# Patient Record
Sex: FEMALE | Race: WHITE | NOT HISPANIC OR LATINO | Employment: FULL TIME | URBAN - METROPOLITAN AREA
[De-identification: names, ages, dates, MRNs, and addresses within clinical notes are randomized per-mention and may not be internally consistent; named-entity substitution may affect disease eponyms.]

---

## 2023-10-31 ENCOUNTER — TELEMEDICINE (OUTPATIENT)
Dept: PRIMARY CARE | Facility: CLINIC | Age: 53
End: 2023-10-31
Payer: COMMERCIAL

## 2023-10-31 ENCOUNTER — PHARMACY VISIT (OUTPATIENT)
Dept: PHARMACY | Facility: CLINIC | Age: 53
End: 2023-10-31

## 2023-10-31 DIAGNOSIS — H66.93 BILATERAL OTITIS MEDIA, UNSPECIFIED OTITIS MEDIA TYPE: ICD-10-CM

## 2023-10-31 DIAGNOSIS — U07.1 COVID-19: Primary | ICD-10-CM

## 2023-10-31 PROBLEM — R10.9 ABDOMINAL PAIN: Status: ACTIVE | Noted: 2023-10-31

## 2023-10-31 PROBLEM — F33.9 MAJOR DEPRESSION, RECURRENT, CHRONIC (CMS-HCC): Status: ACTIVE | Noted: 2023-10-31

## 2023-10-31 PROBLEM — G56.02 LEFT CARPAL TUNNEL SYNDROME: Status: ACTIVE | Noted: 2023-10-31

## 2023-10-31 PROBLEM — F41.9 ANXIETY: Status: ACTIVE | Noted: 2023-10-31

## 2023-10-31 PROBLEM — R13.19 ESOPHAGEAL DYSPHAGIA: Status: ACTIVE | Noted: 2023-10-31

## 2023-10-31 PROBLEM — R51.9 HEADACHE: Status: ACTIVE | Noted: 2023-10-31

## 2023-10-31 PROBLEM — F90.0 ATTENTION DEFICIT HYPERACTIVITY DISORDER (ADHD), PREDOMINANTLY INATTENTIVE TYPE: Status: ACTIVE | Noted: 2023-10-31

## 2023-10-31 PROBLEM — S63.509A WRIST SPRAIN: Status: ACTIVE | Noted: 2023-10-31

## 2023-10-31 PROBLEM — N95.0 POSTMENOPAUSAL BLEEDING: Status: ACTIVE | Noted: 2023-10-31

## 2023-10-31 PROBLEM — J32.9 RECURRENT SINUSITIS: Status: ACTIVE | Noted: 2023-10-31

## 2023-10-31 PROBLEM — F51.01 PRIMARY INSOMNIA: Status: ACTIVE | Noted: 2023-10-31

## 2023-10-31 PROBLEM — G89.29 OTHER CHRONIC PAIN: Status: ACTIVE | Noted: 2023-10-31

## 2023-10-31 PROBLEM — S69.90XA WRIST INJURY: Status: ACTIVE | Noted: 2023-10-31

## 2023-10-31 PROBLEM — E78.5 HYPERLIPIDEMIA LDL GOAL <130: Status: ACTIVE | Noted: 2023-10-31

## 2023-10-31 PROBLEM — R53.82 CHRONIC FATIGUE: Status: ACTIVE | Noted: 2023-10-31

## 2023-10-31 PROBLEM — N88.2 STENOSIS OF CERVIX: Status: ACTIVE | Noted: 2023-10-31

## 2023-10-31 PROBLEM — G43.709 CHRONIC MIGRAINE W/O AURA W/O STATUS MIGRAINOSUS, NOT INTRACTABLE: Status: ACTIVE | Noted: 2023-10-31

## 2023-10-31 PROBLEM — F90.8 ADHD, ADULT RESIDUAL TYPE: Status: ACTIVE | Noted: 2023-10-31

## 2023-10-31 PROBLEM — G56.03 CARPAL TUNNEL SYNDROME ON BOTH SIDES: Status: ACTIVE | Noted: 2023-10-31

## 2023-10-31 PROBLEM — N83.202 CYST OF OVARY, LEFT: Status: ACTIVE | Noted: 2023-10-31

## 2023-10-31 PROBLEM — E55.9 VITAMIN D DEFICIENCY DISEASE: Status: ACTIVE | Noted: 2023-10-31

## 2023-10-31 PROBLEM — F41.8 ANXIETY WITH DEPRESSION: Status: ACTIVE | Noted: 2023-10-31

## 2023-10-31 PROBLEM — N95.1 MENOPAUSAL SYMPTOM: Status: ACTIVE | Noted: 2023-10-31

## 2023-10-31 PROBLEM — F32.A DEPRESSION: Status: ACTIVE | Noted: 2023-10-31

## 2023-10-31 PROBLEM — R87.810 CERVICAL HIGH RISK HPV (HUMAN PAPILLOMAVIRUS) TEST POSITIVE: Status: ACTIVE | Noted: 2023-10-31

## 2023-10-31 PROBLEM — N92.6 IRREGULAR MENSES: Status: ACTIVE | Noted: 2023-10-31

## 2023-10-31 PROBLEM — K90.41 NON-CELIAC GLUTEN SENSITIVITY: Status: ACTIVE | Noted: 2023-10-31

## 2023-10-31 PROBLEM — Z78.9 MEDICAL HOME PATIENT: Status: ACTIVE | Noted: 2023-10-31

## 2023-10-31 PROCEDURE — 99214 OFFICE O/P EST MOD 30 MIN: CPT | Performed by: FAMILY MEDICINE

## 2023-10-31 RX ORDER — NIRMATRELVIR AND RITONAVIR 150-100 MG
2 KIT ORAL 2 TIMES DAILY
Qty: 20 TABLET | Refills: 0 | Status: SHIPPED | OUTPATIENT
Start: 2023-10-31 | End: 2023-12-19 | Stop reason: ALTCHOICE

## 2023-10-31 RX ORDER — AZITHROMYCIN 250 MG/1
TABLET, FILM COATED ORAL
Qty: 6 TABLET | Refills: 0 | Status: SHIPPED | OUTPATIENT
Start: 2023-10-31 | End: 2023-11-05

## 2023-10-31 RX ORDER — NICOTINE POLACRILEX 4 MG
1 GUM BUCCAL DAILY
COMMUNITY
End: 2023-12-19 | Stop reason: WASHOUT

## 2023-10-31 RX ORDER — METHYLPREDNISOLONE 4 MG/1
TABLET ORAL
Qty: 21 TABLET | Refills: 0 | Status: SHIPPED | OUTPATIENT
Start: 2023-10-31 | End: 2023-11-07

## 2023-10-31 NOTE — ASSESSMENT & PLAN NOTE
- Symptoms with positive on COVID-19 test consistent with infection  -5-day isolation protocol recommended from time of symptom onset; may return to regular activities after isolation if no persisting fevers or significant upper respiratory symptoms  -Social distancing and diligent mask wearing advocated for at least 10 days after isolation  -Recent blood work reviewed with dose adjusted Paxlovid sent  - Recommend supportive care with increased fluid intake to thin secretions, Ibuprofen or Tylenol as needed for pain or fever, and steamy showers/saline nasal rinses to help clear the nasal passages   - You may consider tea with honey or a cinnamon stick as these have natural antiviral and antibiotic properties   - Call if symptoms worsen or do not improve with these treatments

## 2023-10-31 NOTE — PROGRESS NOTES
Outpatient Visit Note    Chief Complaint   Patient presents with    Covid-19 Home Monitoring Visit     Tested positive for COVID, symptoms since Thursday 10/6/23.  Symptoms clogged ears, watery eyes, hoarse voice, sore throat, headache.   Has taken Dayquil and Nightquil, also increased fluid intake.    Unable to obtain any vitals.        With patient's permission, this is a Telemedicine visit with video and audio. The provider and patient participated in this telemedicine encounter.    HPI:  Manda Blas is a 53 y.o. female who presents to the office via telemedicine encounter secondary to COVID-19.    She reports extensive travel with recent upper respiratory congestion, malaise and body aches.  States symptoms became most prominent on 10/26/2023.  Focus on supportive care efforts though was due to travel to [place] so she took a COVID-19 test on Monday, 10/30/2023 which came back positive.  States that she has had COVID-19 a total of 5 times though this episode seems slightly worse than prior episodes, noting bilateral ear fullness, sore throat, headache and vocal strain.  Has been attempting DayQuil, NyQuil and increase fluids.  Denies any fever, chills, shortness of breath, difficulty breathing or chest pain.  Does report to have had bilateral ear infections after prior episodes of COVID-19.  Does express concerns regarding her symptoms particularly as she is due to fly to [facility] next week for work.          Current Medications  Current Outpatient Medications   Medication Instructions    cholecalciferol-soy isoflavone 2,000-64 unit-mg tablet 1 Units, oral, Daily    mv-calcium-min-iron fm-FA-vitK 18 mg-400 mcg- 25 mcg tablet 1 tablet, oral, Daily    venlafaxine (EFFOXOR) 25 mg, oral, Every 24 hours    zinc amino acid chelate 50 mg tablet 1 tablet, oral, Daily        Allergies  Allergies   Allergen Reactions    Penicillins Other and Hives    Aspirin-Acetaminophen-Caffeine Headache     Sulfamethoxazole-Trimethoprim Other and Rash        History reviewed. No pertinent past medical history.   History reviewed. No pertinent surgical history.  No family history on file.  Social History     Tobacco Use    Smoking status: Never     Passive exposure: Never    Smokeless tobacco: Never   Vaping Use    Vaping Use: Never used   Substance Use Topics    Drug use: Never     Tobacco Use: Low Risk  (10/31/2023)    Patient History     Smoking Tobacco Use: Never     Smokeless Tobacco Use: Never     Passive Exposure: Never        ROS  All pertinent positive symptoms are included in the history of present illness.  All other systems have been reviewed and are negative and noncontributory to this patient's current ailments.    VITAL SIGNS  Patient is unable to provide    PHYSICAL EXAM  GENERAL APPEARANCE:  Alert and oriented x 3, Pleasant and cooperative, No acute distress.   LUNGS:  No conversational dyspnea or cough during encounter.   PSYCH:  appropriate mood and affect, no difficulty with speech.   Telemedicine visit, no other exam component done.      Assessment/Plan   Problem List Items Addressed This Visit             ICD-10-CM    COVID-19 - Primary U07.1     - Symptoms with positive on COVID-19 test consistent with infection  -5-day isolation protocol recommended from time of symptom onset; may return to regular activities after isolation if no persisting fevers or significant upper respiratory symptoms  -Social distancing and diligent mask wearing advocated for at least 10 days after isolation  -Recent blood work reviewed with dose adjusted Paxlovid sent  - Recommend supportive care with increased fluid intake to thin secretions, Ibuprofen or Tylenol as needed for pain or fever, and steamy showers/saline nasal rinses to help clear the nasal passages   - You may consider tea with honey or a cinnamon stick as these have natural antiviral and antibiotic properties   - Call if symptoms worsen or do not improve  with these treatments         Relevant Medications    nirmatrelvir-ritonavir (Paxlovid) 150-100 mg tablet therapy pack    Bilateral otitis media H66.93     - Secondary to ear fullness and history of bilateral ear infection following COVID-19 infections, I have proactively sent and azithromycin Z-Aydin and a Medrol Dosepak to your regular pharmacy which she can start regimen if this is ultimately needed         Relevant Medications    azithromycin (Zithromax) 250 mg tablet    methylPREDNISolone (Medrol Dospak) 4 mg tablets

## 2023-10-31 NOTE — ASSESSMENT & PLAN NOTE
- Secondary to ear fullness and history of bilateral ear infection following COVID-19 infections, I have proactively sent and azithromycin Z-Aydin and a Medrol Dosepak to your regular pharmacy which she can start regimen if this is ultimately needed

## 2023-11-03 ENCOUNTER — APPOINTMENT (OUTPATIENT)
Dept: PRIMARY CARE | Facility: CLINIC | Age: 53
End: 2023-11-03

## 2023-11-05 DIAGNOSIS — F32.A DEPRESSION, UNSPECIFIED DEPRESSION TYPE: ICD-10-CM

## 2023-11-08 RX ORDER — VENLAFAXINE 25 MG/1
25 TABLET ORAL EVERY OTHER DAY
Qty: 45 TABLET | Refills: 0 | Status: SHIPPED | OUTPATIENT
Start: 2023-11-08 | End: 2023-12-19 | Stop reason: SDUPTHER

## 2023-11-08 NOTE — TELEPHONE ENCOUNTER
Rx refill sent. Patient due for well exam/medication follow up. Please encourage her to schedule at earliest convenience

## 2023-12-18 NOTE — PROGRESS NOTES
Outpatient Visit Note    Chief Complaint   Patient presents with    Annual Exam       HPI:  Manda Blas is a 53 y.o. female with a past medical history significant for anxiety/depression, ADHD, migraines, vitamin-D deficiency and chronic/recurrent sinusitis who presents to the office for annual well exam.  She was last seen in the office on 6/16/2023 secondary to complaints of sprained ankle.    In review, patient was seen as a new patient on 09/28/2022 to establish care and for annual well exam. Review of chart notes that she was previously established with [facility] though discharge for noncompliance with follow-up regarding controlled substance (Ambien).           Continues to lead a very busy life, traveling frequently for her work. States that she will currently plan to get her blood work completed tomorrow. Is particularly interested in having her sugar average checked stating to have had elevated levels in the past. Is curious if she is in prediabetic/diabetic state. If so she would be interested in potential trial mounjaro, as she has desires to lose weight though has struggled over the last several years.    Last panel blood work completed on 6/26/2023 including CBC, CMP, A1c, lipid panel TSH which blood work was remarkable for hyperglycemia with normal A1c of 5.5% and hyperlipidemia.    Well Exam:  Overall, they describes their health as fair with no reports of recent illness or hospitalization. They states that their diet is stable though she continues to struggle with her weight.  Does express interest in going on weight loss medication if possible in the future. In regards to physical activity, continue stay active.  Does admit to left elbow irritation.  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  Noted point tenderness over lateral aspect of left elbow.  Has attempted to manage symptoms with supportive care denying any  significant symptom relief.     Patient continues to struggle with elements of insomnia often using heavy doses of OTC melatonin.  Had previously been on as needed Ambien though weaned off.  Would be interested in trial of alternative such as trazodone which she has used sparingly in the past. Denies issues of chest pain, shortness of breath, headaches, vision/hearing changes, abdominal pain, vomiting, diarrhea, melena, hematochezia, constipation or urinary symptoms.    Preventative Health Maintenance:  In regards to preventative health maintenance, last Tdap received unknown. Flu shot due at this time. In regards to CRC screening, colonoscopy successfully completed in March 2022 with 10-year repeat screening recommended. Shingles vaccination series not pursued to-date.  COVID-19 vaccine series completed with patient currently due for booster.    Last Mammogram completed in June 2023 which was normal.         Anxiety/depression:  Currently take low-dose Effexor every other day. Stated to have been on the medication for several years with questionable effectiveness. Has been very sensitive to the medication noticing withdrawal symptoms when stopping, even at low doses. Expressed interest in cognitive behavioral therapy as she would like to get off of all medications. Has continued with medication at this time reporting continued effectiveness, taking tablet every other day.           GI Issues:  At prior encounter she additionally noted ongoing digestive issues with concerns for sensitivity to gluten. Expressed interest in possible blood work to check for gluten intolerance/celiac disease, particularly as she is awaiting to complete her routine panel blood work previously ordered. Order for blood work was given at last encounter though not completed to date. Noted established relationship with Gastroenterology, having had endoscopy completed within the last 2-3 years.      Current Medications  Current Outpatient  Medications   Medication Instructions    traZODone (DESYREL) 50 mg, oral, Nightly PRN    venlafaxine (EFFEXOR) 25 mg, oral, Daily        Allergies  Allergies   Allergen Reactions    Penicillins Other and Hives    Aspirin-Acetaminophen-Caffeine Headache    Sulfa (Sulfonamide Antibiotics) Rash     bad stomach cramping    Sulfamethoxazole-Trimethoprim Other and Rash        Immunizations  Immunization History   Administered Date(s) Administered    Flu vaccine (IIV4), preservative free *Check age/dose* 09/28/2022    Influenza, injectable, MDCK, preservative free, quadrivalent 10/11/2018    Influenza, seasonal, injectable 12/15/2014    Pfizer Purple Cap SARS-CoV-2 03/31/2021, 04/21/2021        Past Medical History:   Diagnosis Date    ADHD (attention deficit hyperactivity disorder)     Anxiety     Depression       Past Surgical History:   Procedure Laterality Date    TOE FUSION Right 06/2023    big toe     Family History   Problem Relation Name Age of Onset    Other (heart issues) Father       Social History     Tobacco Use    Smoking status: Never     Passive exposure: Never    Smokeless tobacco: Never   Vaping Use    Vaping Use: Never used   Substance Use Topics    Alcohol use: Never    Drug use: Never       ROS  All pertinent positive symptoms are included in the history of present illness.  All other systems have been reviewed and are negative and noncontributory to this patient's current ailments.    VITAL SIGNS  Vitals:    12/19/23 0811   BP: 126/88   Pulse: 85   Temp: 35.4 °C (95.8 °F)   SpO2: 98%       PHYSICAL EXAM  GENERAL APPEARANCE: alert and oriented, Pleasant and cooperative, No Acute Distress.   HEENT: EOMI, PERRLA, TMs intact and flat bilaterally, patent nares, normal oropharynx, MMM  NECK: no lymphadenopathy, no thyromegaly.   HEART: RRR, normal S1S2, no murmurs, click or rubs.   LUNGS: clear to auscultation bilaterally, no wheezes/rhonchi/rales.   ABDOMEN: soft, non-tender, no organomegaly, no masses  palpated, no guarding or rigidity.   EXTREMITIES: no edema, normal ROM  SKIN: normal, no rash, unremarkable.   NEUROLOGIC EXAM: non-focal exam.   MUSCULOSKELETAL: no gross abnormalities.   PSYCH: affect is normal, eye contact is good.     Assessment/Plan   Problem List Items Addressed This Visit             ICD-10-CM    Anxiety with depression F41.8     - Stable on current regimen  -Will continue without medication modification         Relevant Medications    venlafaxine (Effexor) 25 mg tablet    Hyperlipidemia LDL goal <130 E78.5     - Will check cholesterol levels with blood work ordered today  -Continue to focus on healthy, low-fat diet with moderation of carbohydrates         Relevant Orders    Comprehensive metabolic panel    Lipid panel    Tsh With Reflex To Free T4 If Abnormal    Primary insomnia F51.01     - Will continue with melatonin as needed along with prescription of as needed trazodone         Relevant Medications    traZODone (Desyrel) 50 mg tablet    Vitamin D deficiency disease E55.9    Relevant Orders    Vitamin D 25-Hydroxy,Total (for eval of Vitamin D levels)     Other Visit Diagnoses         Codes    Routine adult health maintenance    -  Primary Z00.00    Relevant Orders    Flu vaccine (IIV4) age 6 months and greater, preservative free    Depression, unspecified depression type     F32.A    Relevant Medications    venlafaxine (Effexor) 25 mg tablet    Encounter for immunization     Z23    Relevant Orders    Flu vaccine (IIV4) age 6 months and greater, preservative free            Additional Visit Plans:  - Complete history and physical examination was performed    GENERAL RECOMMENDATIONS:  - Complete review of history of physical exam completed today  - A healthy diet to maintain a normal BMI (under 25) to reduce heart disease, risk for diabetes encouraged.  - Exercising 150 minutes per week and eating healthy to reduce heart disease.  - Blood pressure screen completed.    BLOOD TESTING:  -  Orders for fasting routine blood work given today, to be completed at your earliest convenience  - Will contact you with the blood work results once received and reviewed.    General Recommendations include:  - Cholesterol and diabetes screen if risk factors (overweight, high blood pressure).  - Sexually transmitted infections if risk factors.  - Hepatitis C virus screen for those born between 5319-3935 - N/A     VACCINATIONS RECOMMENDATIONS:  - Flu shot annually - advocated seasonally  - Tetanus booster every 10 years - advocated  - Pneumonia vaccination starting at 65 years old (or earlier if risk factors - smoker, diabetic, heart or lung conditions) -not due yet  - Shingles vaccine for those 50 years or older - check with your insurance for SHINGRIX coverage and get it at your local pharmacy -advocated  -COVID-19 vaccine series completed with patient currently due for booster    SCREENINGS RECOMMENDATIONS:  -Colon cancer screening (with colonoscopy or Cologuard) for men and women starting at age 45 until 74 years old - up-to-date    (female)  - Cervical cancer screening (pap test) in women starting at age 21 until age 65 years old -advocated to be completed with female   - Mammogram screening for breast cancer in women starting at 40-50 years and every 1-2 years until age 74 - up-to-date  - Bone density screening (DEXA) for osteoporosis in women aged 65 years and older (in younger women who are higher risk) - not due yet

## 2023-12-19 ENCOUNTER — OFFICE VISIT (OUTPATIENT)
Dept: PRIMARY CARE | Facility: CLINIC | Age: 53
End: 2023-12-19
Payer: COMMERCIAL

## 2023-12-19 VITALS
DIASTOLIC BLOOD PRESSURE: 88 MMHG | WEIGHT: 199.8 LBS | HEIGHT: 63 IN | HEART RATE: 85 BPM | TEMPERATURE: 95.8 F | BODY MASS INDEX: 35.4 KG/M2 | OXYGEN SATURATION: 98 % | SYSTOLIC BLOOD PRESSURE: 126 MMHG

## 2023-12-19 DIAGNOSIS — Z23 ENCOUNTER FOR IMMUNIZATION: ICD-10-CM

## 2023-12-19 DIAGNOSIS — F32.A DEPRESSION, UNSPECIFIED DEPRESSION TYPE: ICD-10-CM

## 2023-12-19 DIAGNOSIS — F41.8 ANXIETY WITH DEPRESSION: ICD-10-CM

## 2023-12-19 DIAGNOSIS — E78.5 HYPERLIPIDEMIA LDL GOAL <130: ICD-10-CM

## 2023-12-19 DIAGNOSIS — F51.01 PRIMARY INSOMNIA: ICD-10-CM

## 2023-12-19 DIAGNOSIS — Z00.00 ROUTINE ADULT HEALTH MAINTENANCE: Primary | ICD-10-CM

## 2023-12-19 DIAGNOSIS — E55.9 VITAMIN D DEFICIENCY DISEASE: ICD-10-CM

## 2023-12-19 PROBLEM — H66.93 BILATERAL OTITIS MEDIA: Status: RESOLVED | Noted: 2023-10-31 | Resolved: 2023-12-19

## 2023-12-19 PROBLEM — F90.0 ATTENTION DEFICIT HYPERACTIVITY DISORDER (ADHD), PREDOMINANTLY INATTENTIVE TYPE: Status: RESOLVED | Noted: 2023-10-31 | Resolved: 2023-12-19

## 2023-12-19 PROBLEM — Z78.9 MEDICAL HOME PATIENT: Status: RESOLVED | Noted: 2023-10-31 | Resolved: 2023-12-19

## 2023-12-19 PROBLEM — J32.9 RECURRENT SINUSITIS: Status: RESOLVED | Noted: 2023-10-31 | Resolved: 2023-12-19

## 2023-12-19 PROBLEM — R51.9 HEADACHE: Status: RESOLVED | Noted: 2023-10-31 | Resolved: 2023-12-19

## 2023-12-19 PROBLEM — U07.1 COVID-19: Status: RESOLVED | Noted: 2023-10-31 | Resolved: 2023-12-19

## 2023-12-19 PROBLEM — M25.522 LEFT ELBOW PAIN: Status: ACTIVE | Noted: 2023-12-19

## 2023-12-19 PROBLEM — S63.509A WRIST SPRAIN: Status: RESOLVED | Noted: 2023-10-31 | Resolved: 2023-12-19

## 2023-12-19 PROBLEM — F41.9 ANXIETY: Status: RESOLVED | Noted: 2023-10-31 | Resolved: 2023-12-19

## 2023-12-19 PROBLEM — R10.9 ABDOMINAL PAIN: Status: RESOLVED | Noted: 2023-10-31 | Resolved: 2023-12-19

## 2023-12-19 PROBLEM — S69.90XA WRIST INJURY: Status: RESOLVED | Noted: 2023-10-31 | Resolved: 2023-12-19

## 2023-12-19 PROBLEM — N95.0 POSTMENOPAUSAL BLEEDING: Status: RESOLVED | Noted: 2023-10-31 | Resolved: 2023-12-19

## 2023-12-19 PROCEDURE — 99396 PREV VISIT EST AGE 40-64: CPT | Performed by: FAMILY MEDICINE

## 2023-12-19 PROCEDURE — 90471 IMMUNIZATION ADMIN: CPT | Performed by: FAMILY MEDICINE

## 2023-12-19 PROCEDURE — 90686 IIV4 VACC NO PRSV 0.5 ML IM: CPT | Performed by: FAMILY MEDICINE

## 2023-12-19 PROCEDURE — 1036F TOBACCO NON-USER: CPT | Performed by: FAMILY MEDICINE

## 2023-12-19 RX ORDER — VENLAFAXINE 25 MG/1
25 TABLET ORAL DAILY
Qty: 90 TABLET | Refills: 1 | Status: SHIPPED | OUTPATIENT
Start: 2023-12-19 | End: 2024-04-19 | Stop reason: SDUPTHER

## 2023-12-19 RX ORDER — TRAZODONE HYDROCHLORIDE 50 MG/1
50 TABLET ORAL NIGHTLY PRN
Qty: 90 TABLET | Refills: 0 | Status: SHIPPED | OUTPATIENT
Start: 2023-12-19 | End: 2024-03-13

## 2023-12-19 ASSESSMENT — PATIENT HEALTH QUESTIONNAIRE - PHQ9
2. FEELING DOWN, DEPRESSED OR HOPELESS: NOT AT ALL
SUM OF ALL RESPONSES TO PHQ9 QUESTIONS 1 AND 2: 0
1. LITTLE INTEREST OR PLEASURE IN DOING THINGS: NOT AT ALL

## 2023-12-19 ASSESSMENT — PAIN SCALES - GENERAL: PAINLEVEL: 5

## 2023-12-19 NOTE — ASSESSMENT & PLAN NOTE
- Pain syndrome is consistent with tendinitis or overuse to which supportive care is advocated including rest, Advil/Tylenol and support strap  -If symptoms persist, can coordinate orthopedic consultation

## 2023-12-19 NOTE — PATIENT INSTRUCTIONS
Problem List Items Addressed This Visit             ICD-10-CM    Anxiety with depression F41.8     - Stable on current regimen  -Will continue without medication modification         Relevant Medications    venlafaxine (Effexor) 25 mg tablet    Hyperlipidemia LDL goal <130 E78.5     - Will check cholesterol levels with blood work ordered today  -Continue to focus on healthy, low-fat diet with moderation of carbohydrates         Relevant Orders    Comprehensive metabolic panel    Lipid panel    Tsh With Reflex To Free T4 If Abnormal    Primary insomnia F51.01     - Will continue with melatonin as needed along with prescription of as needed trazodone         Relevant Medications    traZODone (Desyrel) 50 mg tablet    Vitamin D deficiency disease E55.9    Relevant Orders    Vitamin D 25-Hydroxy,Total (for eval of Vitamin D levels)     Other Visit Diagnoses         Codes    Routine adult health maintenance    -  Primary Z00.00    Relevant Orders    Flu vaccine (IIV4) age 6 months and greater, preservative free    Depression, unspecified depression type     F32.A    Relevant Medications    venlafaxine (Effexor) 25 mg tablet    Encounter for immunization     Z23    Relevant Orders    Flu vaccine (IIV4) age 6 months and greater, preservative free            Additional Visit Plans:  - Complete history and physical examination was performed    GENERAL RECOMMENDATIONS:  - Complete review of history of physical exam completed today  - A healthy diet to maintain a normal BMI (under 25) to reduce heart disease, risk for diabetes encouraged.  - Exercising 150 minutes per week and eating healthy to reduce heart disease.  - Blood pressure screen completed.    BLOOD TESTING:  - Orders for fasting routine blood work given today, to be completed at your earliest convenience  - Will contact you with the blood work results once received and reviewed.    General Recommendations include:  - Cholesterol and diabetes screen if risk factors  (overweight, high blood pressure).  - Sexually transmitted infections if risk factors.  - Hepatitis C virus screen for those born between 2320-6593 - N/A     VACCINATIONS RECOMMENDATIONS:  - Flu shot annually - advocated seasonally  - Tetanus booster every 10 years - advocated  - Pneumonia vaccination starting at 65 years old (or earlier if risk factors - smoker, diabetic, heart or lung conditions) -not due yet  - Shingles vaccine for those 50 years or older - check with your insurance for SHINGRIX coverage and get it at your local pharmacy -advocated  -COVID-19 vaccine series completed with patient currently due for booster    SCREENINGS RECOMMENDATIONS:  -Colon cancer screening (with colonoscopy or Cologuard) for men and women starting at age 45 until 74 years old - up-to-date    (female)  - Cervical cancer screening (pap test) in women starting at age 21 until age 65 years old -advocated to be completed with female   - Mammogram screening for breast cancer in women starting at 40-50 years and every 1-2 years until age 74 - up-to-date  - Bone density screening (DEXA) for osteoporosis in women aged 65 years and older (in younger women who are higher risk) - not due yet

## 2023-12-19 NOTE — ASSESSMENT & PLAN NOTE
- Will check cholesterol levels with blood work ordered today  -Continue to focus on healthy, low-fat diet with moderation of carbohydrates

## 2024-01-12 ENCOUNTER — TELEPHONE (OUTPATIENT)
Dept: PRIMARY CARE | Facility: CLINIC | Age: 54
End: 2024-01-12
Payer: COMMERCIAL

## 2024-01-12 DIAGNOSIS — Z13.1 SCREENING FOR DIABETES MELLITUS: Primary | ICD-10-CM

## 2024-01-12 NOTE — TELEPHONE ENCOUNTER
Pt called wanted to confirm that the CMP, TSH, and A1c testing are apart of the orders placed on 12/19/23.

## 2024-01-15 NOTE — TELEPHONE ENCOUNTER
Spoke with pt and informed her that TSH,CMP,lipid panel, and vitamin D labs were ordered. Pt is requesting A1C to be added to the lab work because she is seeing a nutritionist for weight loss and the nutritionist asked for her A1C. Please advise.

## 2024-03-11 DIAGNOSIS — F51.01 PRIMARY INSOMNIA: ICD-10-CM

## 2024-03-13 RX ORDER — TRAZODONE HYDROCHLORIDE 50 MG/1
50 TABLET ORAL NIGHTLY PRN
Qty: 90 TABLET | Refills: 1 | Status: SHIPPED | OUTPATIENT
Start: 2024-03-13

## 2024-04-19 ENCOUNTER — TELEMEDICINE (OUTPATIENT)
Dept: PRIMARY CARE | Facility: CLINIC | Age: 54
End: 2024-04-19
Payer: COMMERCIAL

## 2024-04-19 DIAGNOSIS — J06.9 ACUTE URI: Primary | ICD-10-CM

## 2024-04-19 DIAGNOSIS — F32.A DEPRESSION, UNSPECIFIED DEPRESSION TYPE: ICD-10-CM

## 2024-04-19 DIAGNOSIS — F41.8 ANXIETY WITH DEPRESSION: ICD-10-CM

## 2024-04-19 PROCEDURE — 1036F TOBACCO NON-USER: CPT | Performed by: FAMILY MEDICINE

## 2024-04-19 PROCEDURE — 99214 OFFICE O/P EST MOD 30 MIN: CPT | Performed by: FAMILY MEDICINE

## 2024-04-19 RX ORDER — ALBUTEROL SULFATE 90 UG/1
2 AEROSOL, METERED RESPIRATORY (INHALATION) EVERY 4 HOURS PRN
Qty: 8 G | Refills: 5 | Status: SHIPPED | OUTPATIENT
Start: 2024-04-19 | End: 2025-04-19

## 2024-04-19 RX ORDER — AZITHROMYCIN 250 MG/1
TABLET, FILM COATED ORAL DAILY
Qty: 6 TABLET | Refills: 1 | Status: SHIPPED | OUTPATIENT
Start: 2024-04-19 | End: 2024-04-24

## 2024-04-19 RX ORDER — VENLAFAXINE 25 MG/1
25 TABLET ORAL DAILY
Qty: 90 TABLET | Refills: 1 | Status: SHIPPED | OUTPATIENT
Start: 2024-04-19 | End: 2024-10-16

## 2024-04-19 ASSESSMENT — PATIENT HEALTH QUESTIONNAIRE - PHQ9
SUM OF ALL RESPONSES TO PHQ9 QUESTIONS 1 AND 2: 0
1. LITTLE INTEREST OR PLEASURE IN DOING THINGS: NOT AT ALL
2. FEELING DOWN, DEPRESSED OR HOPELESS: NOT AT ALL

## 2024-04-19 ASSESSMENT — PAIN SCALES - GENERAL: PAINLEVEL: 6

## 2024-04-19 NOTE — PATIENT INSTRUCTIONS
Problem List Items Addressed This Visit             ICD-10-CM    Anxiety with depression F41.8     - Stable on current regimen  -Will continue without medication modification         Relevant Medications    venlafaxine (Effexor) 25 mg tablet    Acute URI - Primary J06.9     - Given your symptoms and duration of illness, we feel that you can benefit from antibiotic coverage at this time   - A prescription for azithromycin was sent to your pharmacy, please take this medication as prescribed  -Albuterol inhaler additionally sent to help with breathing  - Recommend supportive care with increased fluid intake to thin secretions, Ibuprofen or Tylenol as needed for pain or fever, and steamy showers/saline nasal rinses to help clear the nasal passages   - You may consider tea with honey or a cinnamon stick as these have natural antiviral and antibiotic properties   - Call if symptoms worsen or do not improve with these treatments         Relevant Medications    azithromycin (Zithromax) 250 mg tablet    albuterol (Ventolin HFA) 90 mcg/actuation inhaler     Other Visit Diagnoses         Codes    Depression, unspecified depression type     F32.A    Relevant Medications    venlafaxine (Effexor) 25 mg tablet            Counseling:       Medication education:         Education:  The patient is counseled regarding potential side-effects of all new medications        Understanding:  Patient expressed understanding        Adherence:  No barriers to adherence identified    ** Please excuse any errors in grammar or translation related to this dictation. Voice recognition software was utilized to prepare this document. **

## 2024-04-19 NOTE — ASSESSMENT & PLAN NOTE
- Given your symptoms and duration of illness, we feel that you can benefit from antibiotic coverage at this time   - A prescription for azithromycin was sent to your pharmacy, please take this medication as prescribed  -Albuterol inhaler additionally sent to help with breathing  - Recommend supportive care with increased fluid intake to thin secretions, Ibuprofen or Tylenol as needed for pain or fever, and steamy showers/saline nasal rinses to help clear the nasal passages   - You may consider tea with honey or a cinnamon stick as these have natural antiviral and antibiotic properties   - Call if symptoms worsen or do not improve with these treatments

## 2024-04-19 NOTE — PROGRESS NOTES
Outpatient Visit Note    Chief Complaint   Patient presents with    Sore Throat     Laryngitis. Sx started on Sunday. Tried resting, drinking water with electrolytes, OTC cold medicine with slight relief.    Cough    Fatigue    Headache    Generalized Body Aches       With patient's permission, this is a Telemedicine visit with video and audio. The provider and patient participated in this telemedicine encounter.    HPI:  Manda Blas is a 53 y.o. female with a past medical history significant for anxiety/depression, ADHD, migraines, vitamin-D deficiency and chronic/recurrent sinusitis who presents to the office via telemedicine encounter secondary to upper respiratory complaints.  She was last seen in the office in December 2023 for annual well exam    She reports approximately 6 days of persistent and progressive upper respiratory congestion with sore throat, vocal strain/hoarseness, cough and headaches.  Does state to have been traveling recently and was around potential sick contacts.  Notes associated body aches.  Has been unable to take COVID-19 test.  Has been attempting to manage with rest, hydration and OTC cold medicine which only provides slight temporary relief.  Does admit to having more congestion and chest over the last 24 hours and slightly wheezy.  Is requesting albuterol inhaler.  Has had good response with azithromycin in the past though her frequent upper respiratory infections have sometimes required need for secondary course of azithromycin.  Does have to travel again in 1 week and is requesting refill on antibiotic to have accessible.    Separately, she states that she is in need of refill on her Effexor.  Medication remains effective with no reported adverse side effects.  No reports of major depressive episodes, anxiety attacks, anhedonia, SI/HI.          Current Medications  Current Outpatient Medications   Medication Instructions    albuterol (Ventolin HFA) 90 mcg/actuation  inhaler 2 puffs, inhalation, Every 4 hours PRN    azithromycin (Zithromax) 250 mg tablet Take 2 tablets (500 mg) by mouth once daily for 1 day, THEN 1 tablet (250 mg) once daily for 4 days. Take 2 tabs (500 mg) by mouth today, than 1 daily for 4 days..    semaglutide 0.5 mg, subcutaneous, Every 7 days    traZODone (DESYREL) 50 mg, oral, Nightly PRN    venlafaxine (EFFEXOR) 25 mg, oral, Daily        Allergies  Allergies   Allergen Reactions    Penicillins Other and Hives    Aspirin-Acetaminophen-Caffeine Headache    Sulfa (Sulfonamide Antibiotics) Rash     bad stomach cramping    Sulfamethoxazole-Trimethoprim Other and Rash        Past Medical History:   Diagnosis Date    ADHD (attention deficit hyperactivity disorder)     Anxiety     Depression       Past Surgical History:   Procedure Laterality Date    TOE FUSION Right 06/2023    big toe     Family History   Problem Relation Name Age of Onset    Other (heart issues) Father       Social History     Tobacco Use    Smoking status: Never     Passive exposure: Never    Smokeless tobacco: Never   Vaping Use    Vaping status: Never Used   Substance Use Topics    Alcohol use: Never    Drug use: Never     Tobacco Use: Low Risk  (4/19/2024)    Patient History     Smoking Tobacco Use: Never     Smokeless Tobacco Use: Never     Passive Exposure: Never        ROS  All pertinent positive symptoms are included in the history of present illness.  All other systems have been reviewed and are negative and noncontributory to this patient's current ailments.    VITAL SIGNS  Patient is unable to provide    PHYSICAL EXAM  GENERAL APPEARANCE:  Alert and oriented x 3, Pleasant and cooperative, No acute distress.   LUNGS:  No conversational dyspnea or cough during encounter.   PSYCH:  appropriate mood and affect, no difficulty with speech.   Telemedicine visit, no other exam component done.      Assessment/Plan   Problem List Items Addressed This Visit             ICD-10-CM    Anxiety with  depression F41.8     - Stable on current regimen  -Will continue without medication modification         Relevant Medications    venlafaxine (Effexor) 25 mg tablet    Acute URI - Primary J06.9     - Given your symptoms and duration of illness, we feel that you can benefit from antibiotic coverage at this time   - A prescription for azithromycin was sent to your pharmacy, please take this medication as prescribed  -Albuterol inhaler additionally sent to help with breathing  - Recommend supportive care with increased fluid intake to thin secretions, Ibuprofen or Tylenol as needed for pain or fever, and steamy showers/saline nasal rinses to help clear the nasal passages   - You may consider tea with honey or a cinnamon stick as these have natural antiviral and antibiotic properties   - Call if symptoms worsen or do not improve with these treatments         Relevant Medications    azithromycin (Zithromax) 250 mg tablet    albuterol (Ventolin HFA) 90 mcg/actuation inhaler     Other Visit Diagnoses         Codes    Depression, unspecified depression type     F32.A    Relevant Medications    venlafaxine (Effexor) 25 mg tablet            Counseling:       Medication education:         Education:  The patient is counseled regarding potential side-effects of all new medications        Understanding:  Patient expressed understanding        Adherence:  No barriers to adherence identified    ** Please excuse any errors in grammar or translation related to this dictation. Voice recognition software was utilized to prepare this document. **

## 2024-05-09 ENCOUNTER — APPOINTMENT (OUTPATIENT)
Dept: PRIMARY CARE | Facility: CLINIC | Age: 54
End: 2024-05-09
Payer: COMMERCIAL

## 2024-06-03 ENCOUNTER — TELEPHONE (OUTPATIENT)
Dept: PRIMARY CARE | Facility: CLINIC | Age: 54
End: 2024-06-03
Payer: COMMERCIAL

## 2024-06-03 DIAGNOSIS — R11.2 ACUTE NAUSEA WITH NONBILIOUS VOMITING: Primary | ICD-10-CM

## 2024-06-03 RX ORDER — ONDANSETRON 4 MG/1
4 TABLET, FILM COATED ORAL EVERY 8 HOURS PRN
Qty: 20 TABLET | Refills: 0 | Status: SHIPPED | OUTPATIENT
Start: 2024-06-03 | End: 2024-06-10

## 2024-06-03 NOTE — TELEPHONE ENCOUNTER
She is on a cruise and is in [place] at hospitals and she is having extreme sea sickness and she is at [facility] and they said if he can rx her zofran they can fill it before she re-boards the boat   Call her and let her know [phone]  I added the pharmacy ([facility] in [facility]) she is at hospitals until 5 pm [place] time (currently 9 am there)

## 2024-11-18 ENCOUNTER — LAB (OUTPATIENT)
Dept: LAB | Facility: LAB | Age: 54
End: 2024-11-18
Payer: COMMERCIAL

## 2024-11-18 DIAGNOSIS — R53.83 OTHER FATIGUE: ICD-10-CM

## 2024-11-18 DIAGNOSIS — E88.810 METABOLIC SYNDROME: Primary | ICD-10-CM

## 2024-11-18 DIAGNOSIS — E78.5 HYPERLIPIDEMIA LDL GOAL <130: ICD-10-CM

## 2024-11-18 DIAGNOSIS — R63.5 ABNORMAL WEIGHT GAIN: ICD-10-CM

## 2024-11-18 DIAGNOSIS — E66.01 MORBID (SEVERE) OBESITY DUE TO EXCESS CALORIES (MULTI): ICD-10-CM

## 2024-11-18 DIAGNOSIS — Z13.1 SCREENING FOR DIABETES MELLITUS: ICD-10-CM

## 2024-11-18 DIAGNOSIS — E55.9 VITAMIN D DEFICIENCY DISEASE: ICD-10-CM

## 2024-11-18 LAB
25(OH)D3 SERPL-MCNC: 46 NG/ML (ref 30–100)
ALBUMIN SERPL BCP-MCNC: 4.4 G/DL (ref 3.4–5)
ALP SERPL-CCNC: 68 U/L (ref 33–110)
ALT SERPL W P-5'-P-CCNC: 24 U/L (ref 7–45)
ANION GAP SERPL CALC-SCNC: 11 MMOL/L (ref 10–20)
AST SERPL W P-5'-P-CCNC: 18 U/L (ref 9–39)
BILIRUB SERPL-MCNC: 0.5 MG/DL (ref 0–1.2)
BUN SERPL-MCNC: 20 MG/DL (ref 6–23)
CALCIUM SERPL-MCNC: 10 MG/DL (ref 8.6–10.6)
CHLORIDE SERPL-SCNC: 105 MMOL/L (ref 98–107)
CHOLEST SERPL-MCNC: 191 MG/DL (ref 0–199)
CHOLESTEROL/HDL RATIO: 3.1
CO2 SERPL-SCNC: 28 MMOL/L (ref 21–32)
CREAT SERPL-MCNC: 0.94 MG/DL (ref 0.5–1.05)
EGFRCR SERPLBLD CKD-EPI 2021: 73 ML/MIN/1.73M*2
ERYTHROCYTE [DISTWIDTH] IN BLOOD BY AUTOMATED COUNT: 12.6 % (ref 11.5–14.5)
EST. AVERAGE GLUCOSE BLD GHB EST-MCNC: 108 MG/DL
GLUCOSE SERPL-MCNC: 93 MG/DL (ref 74–99)
HBA1C MFR BLD: 5.4 %
HCT VFR BLD AUTO: 44.7 % (ref 36–46)
HDLC SERPL-MCNC: 62 MG/DL
HGB BLD-MCNC: 14.6 G/DL (ref 12–16)
INSULIN P FAST SERPL-ACNC: 6 UIU/ML (ref 3–25)
LDLC SERPL CALC-MCNC: 102 MG/DL
LIPASE SERPL-CCNC: 84 U/L (ref 9–82)
MCH RBC QN AUTO: 29.1 PG (ref 26–34)
MCHC RBC AUTO-ENTMCNC: 32.7 G/DL (ref 32–36)
MCV RBC AUTO: 89 FL (ref 80–100)
NON HDL CHOLESTEROL: 129 MG/DL (ref 0–149)
NRBC BLD-RTO: 0 /100 WBCS (ref 0–0)
PLATELET # BLD AUTO: 320 X10*3/UL (ref 150–450)
POTASSIUM SERPL-SCNC: 4.4 MMOL/L (ref 3.5–5.3)
PROT SERPL-MCNC: 7.3 G/DL (ref 6.4–8.2)
RBC # BLD AUTO: 5.02 X10*6/UL (ref 4–5.2)
SODIUM SERPL-SCNC: 140 MMOL/L (ref 136–145)
T4 FREE SERPL-MCNC: 1.05 NG/DL (ref 0.78–1.48)
TRIGL SERPL-MCNC: 136 MG/DL (ref 0–149)
TSH SERPL-ACNC: 5.85 MIU/L (ref 0.44–3.98)
VIT B12 SERPL-MCNC: 1337 PG/ML (ref 211–911)
VLDL: 27 MG/DL (ref 0–40)
WBC # BLD AUTO: 6.7 X10*3/UL (ref 4.4–11.3)

## 2024-11-18 PROCEDURE — 82607 VITAMIN B-12: CPT

## 2024-11-18 PROCEDURE — 83525 ASSAY OF INSULIN: CPT

## 2024-11-18 PROCEDURE — 83690 ASSAY OF LIPASE: CPT

## 2024-11-18 PROCEDURE — 80053 COMPREHEN METABOLIC PANEL: CPT

## 2024-11-18 PROCEDURE — 84439 ASSAY OF FREE THYROXINE: CPT

## 2024-11-18 PROCEDURE — 36415 COLL VENOUS BLD VENIPUNCTURE: CPT

## 2024-11-18 PROCEDURE — 82306 VITAMIN D 25 HYDROXY: CPT

## 2024-11-18 PROCEDURE — 85027 COMPLETE CBC AUTOMATED: CPT

## 2024-11-18 PROCEDURE — 83036 HEMOGLOBIN GLYCOSYLATED A1C: CPT

## 2024-11-18 PROCEDURE — 80061 LIPID PANEL: CPT

## 2024-11-18 PROCEDURE — 84443 ASSAY THYROID STIM HORMONE: CPT

## 2024-11-19 ENCOUNTER — TELEPHONE (OUTPATIENT)
Dept: PRIMARY CARE | Facility: CLINIC | Age: 54
End: 2024-11-19
Payer: COMMERCIAL

## 2024-11-19 DIAGNOSIS — E03.9 HYPOTHYROIDISM, UNSPECIFIED TYPE: Primary | ICD-10-CM

## 2024-11-19 RX ORDER — LEVOTHYROXINE SODIUM 50 UG/1
50 TABLET ORAL DAILY
Qty: 90 TABLET | Refills: 1 | Status: SHIPPED | OUTPATIENT
Start: 2024-11-19 | End: 2025-05-18

## 2024-11-19 NOTE — TELEPHONE ENCOUNTER
Manda is calling stating she reviewed 's message today on [facility]. She would like to speak with the DrJules Regarding a few things. She is stating she has lost 61 ponds since being on the medication:     semaglutide 0.25 mg or 0.5 mg (2 mg/3 mL) pen injector Inject 0.5 mg under the skin every 7 days.     And has also cleaned up her diet and has been working out every day. She is stating she is very concerned because she has been very fatigue. She is asking if this has anything to do with her thyroid. She is asking if she can take thyroid medication with the semaglutide. She is also stating she is under  another 's care for the semaglutide shots. She has an appointment today for the shots at 2:00pm and is hoping to hear from our office before then. Manda's # [phone]

## 2024-11-19 NOTE — TELEPHONE ENCOUNTER
Spoke to patient and reviewed her blood work.  Plan is set to start levothyroxine with recheck of level in 6 weeks.  Orders placed with prescription sent to pharmacy

## 2024-12-23 ENCOUNTER — OFFICE VISIT (OUTPATIENT)
Dept: PRIMARY CARE | Facility: CLINIC | Age: 54
End: 2024-12-23
Payer: COMMERCIAL

## 2024-12-23 VITALS
DIASTOLIC BLOOD PRESSURE: 82 MMHG | WEIGHT: 142 LBS | HEART RATE: 92 BPM | SYSTOLIC BLOOD PRESSURE: 116 MMHG | HEIGHT: 63 IN | TEMPERATURE: 96.2 F | BODY MASS INDEX: 25.16 KG/M2 | OXYGEN SATURATION: 97 %

## 2024-12-23 DIAGNOSIS — M54.2 CERVICALGIA: ICD-10-CM

## 2024-12-23 DIAGNOSIS — G89.29 CHRONIC BILATERAL THORACIC BACK PAIN: ICD-10-CM

## 2024-12-23 DIAGNOSIS — M54.6 CHRONIC BILATERAL THORACIC BACK PAIN: ICD-10-CM

## 2024-12-23 DIAGNOSIS — E03.9 HYPOTHYROIDISM, UNSPECIFIED TYPE: ICD-10-CM

## 2024-12-23 DIAGNOSIS — Z12.31 BREAST CANCER SCREENING BY MAMMOGRAM: ICD-10-CM

## 2024-12-23 DIAGNOSIS — F51.01 PRIMARY INSOMNIA: ICD-10-CM

## 2024-12-23 DIAGNOSIS — F41.8 ANXIETY WITH DEPRESSION: ICD-10-CM

## 2024-12-23 DIAGNOSIS — Z00.00 ROUTINE ADULT HEALTH MAINTENANCE: Primary | ICD-10-CM

## 2024-12-23 DIAGNOSIS — E78.5 HYPERLIPIDEMIA LDL GOAL <130: ICD-10-CM

## 2024-12-23 DIAGNOSIS — Z12.4 CERVICAL CANCER SCREENING: ICD-10-CM

## 2024-12-23 PROCEDURE — 3008F BODY MASS INDEX DOCD: CPT | Performed by: FAMILY MEDICINE

## 2024-12-23 PROCEDURE — 99396 PREV VISIT EST AGE 40-64: CPT | Performed by: FAMILY MEDICINE

## 2024-12-23 PROCEDURE — 99214 OFFICE O/P EST MOD 30 MIN: CPT | Performed by: FAMILY MEDICINE

## 2024-12-23 PROCEDURE — 1036F TOBACCO NON-USER: CPT | Performed by: FAMILY MEDICINE

## 2024-12-23 RX ORDER — TRAZODONE HYDROCHLORIDE 100 MG/1
100 TABLET ORAL NIGHTLY PRN
Qty: 30 TABLET | Refills: 2 | Status: SHIPPED | OUTPATIENT
Start: 2024-12-23 | End: 2025-12-23

## 2024-12-23 ASSESSMENT — PATIENT HEALTH QUESTIONNAIRE - PHQ9
1. LITTLE INTEREST OR PLEASURE IN DOING THINGS: NOT AT ALL
2. FEELING DOWN, DEPRESSED OR HOPELESS: NOT AT ALL
SUM OF ALL RESPONSES TO PHQ9 QUESTIONS 1 AND 2: 0

## 2024-12-23 ASSESSMENT — PAIN SCALES - GENERAL: PAINLEVEL_OUTOF10: 5

## 2024-12-23 NOTE — ASSESSMENT & PLAN NOTE
- Will monitor thyroid level with recent supplementation  -Please complete repeat blood work at earliest convenience

## 2024-12-23 NOTE — ASSESSMENT & PLAN NOTE
- With chronic back and neck pain, will plan to set up consultation with chiropractic medicine to which I have put a referral out to our network

## 2024-12-23 NOTE — ASSESSMENT & PLAN NOTE
- Fantastic job on losing weight!  -Will continue to balance things with healthy diet and regular physical activity

## 2024-12-23 NOTE — PATIENT INSTRUCTIONS
Problem List Items Addressed This Visit             ICD-10-CM    Anxiety with depression F41.8     - Stable on current regimen  -Will continue without medication modification         Hyperlipidemia LDL goal <130 E78.5     -Continue to focus on healthy, low-fat diet with moderation of carbohydrates         Chronic bilateral thoracic back pain M54.6, G89.29    Relevant Orders    Referral to Chiropractic Medicine - (External)    Primary insomnia F51.01     - Will trial higher dose of trazodone starting at 100 mg nightly which can be adjusted by 50 mg dose up to 200 mg if needed         Relevant Medications    traZODone (Desyrel) 100 mg tablet    Hypothyroidism E03.9     - Will monitor thyroid level with recent supplementation  -Please complete repeat blood work at earliest convenience         Relevant Orders    Tsh With Reflex To Free T4 If Abnormal    Cervicalgia M54.2     - With chronic back and neck pain, will plan to set up consultation with chiropractic medicine to which I have put a referral out to our network         Relevant Orders    Referral to Chiropractic Medicine - (External)     Other Visit Diagnoses         Codes    Routine adult health maintenance    -  Primary Z00.00    Relevant Orders    BI mammo bilateral screening tomosynthesis    Breast cancer screening by mammogram     Z12.31    Relevant Orders    BI mammo bilateral screening tomosynthesis    Cervical cancer screening     Z12.4    Relevant Orders    Referral to Obstetrics / Gynecology            Additional Visit Plans:  - Complete history and physical examination was performed    GENERAL RECOMMENDATIONS:  - Complete review of history of physical exam completed today  - A healthy diet to maintain a normal BMI (under 25) to reduce heart disease, risk for diabetes encouraged.  - Exercising 150 minutes per week and eating healthy to reduce heart disease.  - Blood pressure screen completed.    BLOOD TESTING:  -Will monitor thyroid level with repeat  blood work    General Recommendations include:  - Cholesterol and diabetes screen if risk factors (overweight, high blood pressure).  - Sexually transmitted infections if risk factors.  - Hepatitis C virus screen for all adults -completed and negative    VACCINATIONS RECOMMENDATIONS:  - Flu shot annually - advocated seasonally  - Tetanus booster every 10 years - advocated  - Pneumonia vaccination starting at 65 years old (or earlier if risk factors - smoker, diabetic, heart or lung conditions) -not due yet  - Shingles vaccine for those 50 years or older - check with your insurance for SHINGRIX coverage and get it at your local pharmacy -advocated  -COVID-19 vaccine series completed with patient currently due for booster    SCREENINGS RECOMMENDATIONS:  -Colon cancer screening (with colonoscopy or Cologuard) for men and women starting at age 45 until 74 years old - up-to-date    (female)  - Cervical cancer screening (pap test) in women starting at age 21 until age 65 years old -up-to-date  - Mammogram screening for breast cancer in women starting at 40-50 years and every 1-2 years until age 74 - advocated  - Bone density screening (DEXA) for osteoporosis in women aged 65 years and older (in younger women who are higher risk) - not due yet    Counseling:       Medication education:         Education:  The patient is counseled regarding potential side-effects of all new medications        Understanding:  Patient expressed understanding        Adherence:  No barriers to adherence identified      ** Please excuse any errors in grammar or translation related to this dictation. Voice recognition software was utilized to prepare this document. **

## 2024-12-23 NOTE — PROGRESS NOTES
Outpatient Visit Note    Chief Complaint   Patient presents with    Annual Exam       HPI:  Manda Blas is a 54 y.o. female who presents to the office for annual well exam.  She was last seen in the office in April 2024 via telemedicine encounter secondary to acute URI    Blood work successfully completed in November in which A1c sugar average shows no signs of diabetes/prediabetes.  Vitamin D level was normal with no deficiency.  Blood sugars, electrolytes, kidney and liver levels were stable.  Thyroid level showed underactivity with thyroid supplementation initiated to which she reports poor compliance with medication though she has recently started taking faithfully and will plan to have repeat blood work in the future.  Cholesterol levels had dramatically improved with only minimal elevation of LDL/bad cholesterol.     Well Exam:  Overall, they describe their health as good with no reports of recent illness or hospitalization. They state that their diet is has dramatically improved with her utilizing semaglutide having lost over 50 pounds throughout the course of the year. In regards to physical activity, she is increasing physical activity with hopes of performing more muscle building activities in the future, potentially involving bodybuilding.  Does deal with recurrent upper back and neck pain to which she expresses interest in consultation with chiropractic medicine. Denies any significant sleep complaints. Denies issues of chest pain, shortness of breath, headaches, vision/hearing changes, abdominal pain, vomiting, diarrhea, melena, hematochezia, constipation or urinary symptoms.    Preventative Health Maintenance:  In regards to preventative health maintenance, last Tdap received unknown. Flu shot due at this time. In regards to CRC screening, colonoscopy successfully completed in 2022. Shingles vaccination series not pursued to-date.  COVID-19 vaccine series completed with patient currently  due for booster.    She has an established relationship with OB/GYN who organizes her routine female health maintenance exams. Last Mammogram June 2023. Last Pap completed in 2020.    Anxiety/depression:  Currently take low-dose Effexor every other day. Stated to have been on the medication for several years with questionable effectiveness. Has been very sensitive to the medication noticing withdrawal symptoms when stopping, even at low doses. Expressed interest in cognitive behavioral therapy as she would like to get off of all medications. Has continued with medication at this time reporting continued effectiveness, taking tablet every other day.    Patient continues to struggle with elements of insomnia often using heavy doses of OTC melatonin.  Had previously been on as needed Ambien though weaned off and transition to trazodone.  Stated that trazodone was ineffective though never had dose adjustment beyond initial 50 mg.  Is willing to retry           Current Medications  Current Outpatient Medications   Medication Instructions    levothyroxine (SYNTHROID, LEVOXYL) 50 mcg, oral, Daily, Take on an empty stomach at the same time each day, either 30 to 60 minutes prior to breakfast    semaglutide 0.5 mg, Every 7 days    traZODone (DESYREL) 100 mg, oral, Nightly PRN    venlafaxine (EFFEXOR) 25 mg, oral, Daily        Allergies  Allergies   Allergen Reactions    Penicillins Other and Hives    Aspirin-Acetaminophen-Caffeine Headache    Sulfa (Sulfonamide Antibiotics) Rash     bad stomach cramping    Sulfamethoxazole-Trimethoprim Other and Rash        Immunizations  Immunization History   Administered Date(s) Administered    Flu vaccine (IIV4), preservative free *Check age/dose* 09/28/2022, 12/19/2023    Flu vaccine, quadrivalent, no egg protein, age 6 month or greater (FLUCELVAX) 10/11/2018    Influenza, injectable, quadrivalent 09/28/2022    Influenza, seasonal, injectable 12/15/2014    Pfizer Purple Cap SARS-CoV-2  03/31/2021, 04/21/2021        Past Medical History:   Diagnosis Date    ADHD (attention deficit hyperactivity disorder)     Anxiety     Depression       Past Surgical History:   Procedure Laterality Date    TOE FUSION Right 06/2023    big toe     Family History   Problem Relation Name Age of Onset    Other (heart issues) Father       Social History     Tobacco Use    Smoking status: Never     Passive exposure: Never    Smokeless tobacco: Never   Vaping Use    Vaping status: Never Used   Substance Use Topics    Alcohol use: Never    Drug use: Never       ROS  All pertinent positive symptoms are included in the history of present illness.  All other systems have been reviewed and are negative and noncontributory to this patient's current ailments.    VITAL SIGNS  Vitals:    12/23/24 1409   BP: 116/82   Pulse: 92   Temp: 35.7 °C (96.2 °F)   SpO2: 97%       PHYSICAL EXAM  GENERAL APPEARANCE: alert and oriented, Pleasant and cooperative, No Acute Distress.   HEENT: EOMI, PERRLA, TMs intact and flat bilaterally, patent nares, normal oropharynx, MMM  NECK: no lymphadenopathy, no thyromegaly.   HEART: RRR, normal S1S2, no murmurs, click or rubs.   LUNGS: clear to auscultation bilaterally, no wheezes/rhonchi/rales.   ABDOMEN: soft, non-tender, no organomegaly, no masses palpated, no guarding or rigidity.   EXTREMITIES: no edema, normal ROM  SKIN: normal, no rash, unremarkable.   NEUROLOGIC EXAM: non-focal exam.   MUSCULOSKELETAL: no gross abnormalities.   PSYCH: affect is normal, eye contact is good.     Assessment/Plan   Problem List Items Addressed This Visit             ICD-10-CM    Anxiety with depression F41.8     - Stable on current regimen  -Will continue without medication modification         Hyperlipidemia LDL goal <130 E78.5     -Continue to focus on healthy, low-fat diet with moderation of carbohydrates         Chronic bilateral thoracic back pain M54.6, G89.29    Relevant Orders    Referral to Chiropractic  Medicine - (External)    Primary insomnia F51.01     - Will trial higher dose of trazodone starting at 100 mg nightly which can be adjusted by 50 mg dose up to 200 mg if needed         Relevant Medications    traZODone (Desyrel) 100 mg tablet    Hypothyroidism E03.9     - Will monitor thyroid level with recent supplementation  -Please complete repeat blood work at earliest convenience         Relevant Orders    Tsh With Reflex To Free T4 If Abnormal    Cervicalgia M54.2     - With chronic back and neck pain, will plan to set up consultation with chiropractic medicine to which I have put a referral out to our network         Relevant Orders    Referral to Chiropractic Medicine - (External)     Other Visit Diagnoses         Codes    Routine adult health maintenance    -  Primary Z00.00    Relevant Orders    BI mammo bilateral screening tomosynthesis    Breast cancer screening by mammogram     Z12.31    Relevant Orders    BI mammo bilateral screening tomosynthesis    Cervical cancer screening     Z12.4    Relevant Orders    Referral to Obstetrics / Gynecology            Additional Visit Plans:  - Complete history and physical examination was performed    GENERAL RECOMMENDATIONS:  - Complete review of history of physical exam completed today  - A healthy diet to maintain a normal BMI (under 25) to reduce heart disease, risk for diabetes encouraged.  - Exercising 150 minutes per week and eating healthy to reduce heart disease.  - Blood pressure screen completed.    BLOOD TESTING:  -Will monitor thyroid level with repeat blood work    General Recommendations include:  - Cholesterol and diabetes screen if risk factors (overweight, high blood pressure).  - Sexually transmitted infections if risk factors.  - Hepatitis C virus screen for all adults -completed and negative    VACCINATIONS RECOMMENDATIONS:  - Flu shot annually - advocated seasonally  - Tetanus booster every 10 years - advocated  - Pneumonia vaccination starting  at 65 years old (or earlier if risk factors - smoker, diabetic, heart or lung conditions) -not due yet  - Shingles vaccine for those 50 years or older - check with your insurance for SHINGRIX coverage and get it at your local pharmacy -advocated  -COVID-19 vaccine series completed with patient currently due for booster    SCREENINGS RECOMMENDATIONS:  -Colon cancer screening (with colonoscopy or Cologuard) for men and women starting at age 45 until 74 years old - up-to-date    (female)  - Cervical cancer screening (pap test) in women starting at age 21 until age 65 years old -up-to-date  - Mammogram screening for breast cancer in women starting at 40-50 years and every 1-2 years until age 74 - advocated  - Bone density screening (DEXA) for osteoporosis in women aged 65 years and older (in younger women who are higher risk) - not due yet    Counseling:       Medication education:         Education:  The patient is counseled regarding potential side-effects of all new medications        Understanding:  Patient expressed understanding        Adherence:  No barriers to adherence identified      ** Please excuse any errors in grammar or translation related to this dictation. Voice recognition software was utilized to prepare this document. **

## 2024-12-23 NOTE — ASSESSMENT & PLAN NOTE
- Will trial higher dose of trazodone starting at 100 mg nightly which can be adjusted by 50 mg dose up to 200 mg if needed

## 2025-01-02 ENCOUNTER — OFFICE VISIT (OUTPATIENT)
Dept: PRIMARY CARE | Facility: CLINIC | Age: 55
End: 2025-01-02
Payer: COMMERCIAL

## 2025-01-02 VITALS
BODY MASS INDEX: 25.16 KG/M2 | OXYGEN SATURATION: 100 % | TEMPERATURE: 96.7 F | DIASTOLIC BLOOD PRESSURE: 78 MMHG | SYSTOLIC BLOOD PRESSURE: 116 MMHG | HEIGHT: 63 IN | HEART RATE: 91 BPM | WEIGHT: 142 LBS

## 2025-01-02 DIAGNOSIS — G89.29 CHRONIC BILATERAL THORACIC BACK PAIN: ICD-10-CM

## 2025-01-02 DIAGNOSIS — M54.50 ACUTE BILATERAL LOW BACK PAIN, UNSPECIFIED WHETHER SCIATICA PRESENT: ICD-10-CM

## 2025-01-02 DIAGNOSIS — K59.09 OTHER CONSTIPATION: Primary | ICD-10-CM

## 2025-01-02 DIAGNOSIS — R10.30 LOWER ABDOMINAL PAIN: ICD-10-CM

## 2025-01-02 DIAGNOSIS — M54.6 CHRONIC BILATERAL THORACIC BACK PAIN: ICD-10-CM

## 2025-01-02 LAB
POC APPEARANCE, URINE: CLEAR
POC BILIRUBIN, URINE: NEGATIVE
POC BLOOD, URINE: NEGATIVE
POC COLOR, URINE: YELLOW
POC GLUCOSE, URINE: NEGATIVE MG/DL
POC KETONES, URINE: NEGATIVE MG/DL
POC LEUKOCYTES, URINE: ABNORMAL
POC NITRITE,URINE: NEGATIVE
POC PH, URINE: 6 PH
POC PROTEIN, URINE: NEGATIVE MG/DL
POC SPECIFIC GRAVITY, URINE: 1.01
POC UROBILINOGEN, URINE: 0.2 EU/DL

## 2025-01-02 PROCEDURE — 81003 URINALYSIS AUTO W/O SCOPE: CPT | Performed by: FAMILY MEDICINE

## 2025-01-02 PROCEDURE — 87086 URINE CULTURE/COLONY COUNT: CPT | Performed by: FAMILY MEDICINE

## 2025-01-02 PROCEDURE — 1036F TOBACCO NON-USER: CPT | Performed by: FAMILY MEDICINE

## 2025-01-02 PROCEDURE — 99214 OFFICE O/P EST MOD 30 MIN: CPT | Performed by: FAMILY MEDICINE

## 2025-01-02 PROCEDURE — 3008F BODY MASS INDEX DOCD: CPT | Performed by: FAMILY MEDICINE

## 2025-01-02 ASSESSMENT — PAIN SCALES - GENERAL: PAINLEVEL_OUTOF10: 6

## 2025-01-02 NOTE — ASSESSMENT & PLAN NOTE
- With exacerbated low back pain, will additionally plan to have chiropractic medicine intervene; chiropractic referral updated

## 2025-01-02 NOTE — ASSESSMENT & PLAN NOTE
- OTC fleets enema advocated along with continued hydration efforts, Metamucil and Colace  -Going forward would recommend continuing with bowel regimen adding magnesium oxide 400 mg daily  -Symptoms likely exacerbated by semaglutide which may have to be modified if symptoms persist

## 2025-01-02 NOTE — ASSESSMENT & PLAN NOTE
- Urinalysis performed in office which did show both sides to which we will monitor urine culture; order placed   change of breathing pattern/cough/fever

## 2025-01-02 NOTE — PROGRESS NOTES
Outpatient Visit Note    Chief Complaint   Patient presents with    Constipation     Constipation with pain since Sunday. Tried metamucil, senna, colace, OTC laxative, suppositories with no relief. Drinking water with electrolytes. Did self extract once and stool was dry and chalky.         HPI:  Manda Blas is a 54 y.o. female who presents to the office secondary to complaints of constipation.  Patient was recently seen on 12/23/2024 for annual well exam.    Patient has had constipation which has been traditionally managed with increased fluids and fiber with occasional Colace.  States that symptoms have been more prominent over the course of the last week, with patient having growing abdominal discomfort.  Has tried additional regimen including senna suppositories and attempts at manual disimpaction.  Notes decreased appetite with slight nausea without vomiting.  Denies any major dietary or medication changes prior to symptom onset.  Has noted slight low back pain without overt urinary abnormalities.  Is scheduled to leave for [place] in 3 days.    Current Medications  Current Outpatient Medications   Medication Instructions    levothyroxine (SYNTHROID, LEVOXYL) 50 mcg, oral, Daily, Take on an empty stomach at the same time each day, either 30 to 60 minutes prior to breakfast    semaglutide 0.5 mg, Every 7 days    traZODone (DESYREL) 100 mg, oral, Nightly PRN    venlafaxine (EFFEXOR) 25 mg, oral, Daily        Allergies  Allergies   Allergen Reactions    Penicillins Other and Hives    Aspirin-Acetaminophen-Caffeine Headache    Estrogens Headache    Sulfa (Sulfonamide Antibiotics) Rash     bad stomach cramping    Sulfamethoxazole-Trimethoprim Other and Rash        Past Medical History:   Diagnosis Date    ADHD (attention deficit hyperactivity disorder)     Anxiety     Depression       Past Surgical History:   Procedure Laterality Date    TOE FUSION Right 06/2023    big toe     Family History    Problem Relation Name Age of Onset    Other (heart issues) Father       Social History     Tobacco Use    Smoking status: Never     Passive exposure: Never    Smokeless tobacco: Never   Vaping Use    Vaping status: Never Used   Substance Use Topics    Alcohol use: Never    Drug use: Never       ROS  All pertinent positive symptoms are included in the history of present illness.  All other systems have been reviewed and are negative and noncontributory to this patient's current ailments.    VITAL SIGNS  Vitals:    01/02/25 1544   BP: 116/78   Pulse: 91   Temp: 35.9 °C (96.7 °F)   SpO2: 100%       PHYSICAL EXAM  GENERAL APPEARANCE: alert and oriented, Pleasant and cooperative, No Acute Distress  HEENT: EOMI, PERRLA, MMM  HEART: RRR, normal S1S2, no murmurs, click or rubs  LUNGS: clear to auscultation bilaterally, no wheezes/rhonchi/rales  ABDOMEN: soft, non-tender, no organomegaly, no masses palpated, no guarding or rigidity.   EXTREMITIES: no edema, normal ROM  SKIN: normal, no rash, unremarkable  NEUROLOGIC EXAM: non-focal exam  MUSCULOSKELETAL: no gross abnormalities  PSYCH: affect is normal, eye contact is good or language.       Assessment/Plan   Problem List Items Addressed This Visit             ICD-10-CM    Chronic bilateral thoracic back pain M54.6, G89.29    Relevant Orders    Referral to Chiropractic Medicine - (External)    Lower abdominal pain R10.30     - Urinalysis performed in office which did show both sides to which we will monitor urine culture; order placed         Relevant Orders    POCT UA Automated manually resulted (Completed)    Urine Culture    Other constipation - Primary K59.09     - OTC fleets enema advocated along with continued hydration efforts, Metamucil and Colace  -Going forward would recommend continuing with bowel regimen adding magnesium oxide 400 mg daily  -Symptoms likely exacerbated by semaglutide which may have to be modified if symptoms persist         Acute bilateral low  back pain M54.50     - With exacerbated low back pain, will additionally plan to have chiropractic medicine intervene; chiropractic referral updated         Relevant Orders    Referral to Chiropractic Medicine - (External)       Counseling:       Medication education:         Education:  The patient is counseled regarding potential side-effects of all new medications        Understanding:  Patient expressed understanding        Adherence:  No barriers to adherence identified    ** Please excuse any errors in grammar or translation related to this dictation. Voice recognition software was utilized to prepare this document. **

## 2025-01-04 LAB — BACTERIA UR CULT: NORMAL

## 2025-01-06 DIAGNOSIS — F51.01 PRIMARY INSOMNIA: ICD-10-CM

## 2025-01-06 RX ORDER — TRAZODONE HYDROCHLORIDE 100 MG/1
100 TABLET ORAL NIGHTLY PRN
Qty: 90 TABLET | Refills: 3 | Status: SHIPPED | OUTPATIENT
Start: 2025-01-06 | End: 2026-01-06

## 2025-01-24 DIAGNOSIS — F32.A DEPRESSION, UNSPECIFIED DEPRESSION TYPE: ICD-10-CM

## 2025-01-24 DIAGNOSIS — F41.8 ANXIETY WITH DEPRESSION: ICD-10-CM

## 2025-01-24 RX ORDER — VENLAFAXINE 25 MG/1
25 TABLET ORAL EVERY OTHER DAY
Qty: 45 TABLET | Refills: 3 | Status: SHIPPED | OUTPATIENT
Start: 2025-01-24 | End: 2026-01-24

## 2025-02-17 ENCOUNTER — HOSPITAL ENCOUNTER (OUTPATIENT)
Dept: RADIOLOGY | Facility: HOSPITAL | Age: 55
Discharge: HOME | End: 2025-02-17
Payer: COMMERCIAL

## 2025-02-17 VITALS — BODY MASS INDEX: 25.16 KG/M2 | HEIGHT: 63 IN | WEIGHT: 142 LBS

## 2025-02-17 DIAGNOSIS — Z12.31 BREAST CANCER SCREENING BY MAMMOGRAM: ICD-10-CM

## 2025-02-17 DIAGNOSIS — Z00.00 ROUTINE ADULT HEALTH MAINTENANCE: ICD-10-CM

## 2025-02-17 PROCEDURE — 77063 BREAST TOMOSYNTHESIS BI: CPT | Performed by: STUDENT IN AN ORGANIZED HEALTH CARE EDUCATION/TRAINING PROGRAM

## 2025-02-17 PROCEDURE — 77067 SCR MAMMO BI INCL CAD: CPT

## 2025-02-17 PROCEDURE — 77067 SCR MAMMO BI INCL CAD: CPT | Performed by: STUDENT IN AN ORGANIZED HEALTH CARE EDUCATION/TRAINING PROGRAM

## 2025-02-20 ENCOUNTER — TELEPHONE (OUTPATIENT)
Dept: PRIMARY CARE | Facility: CLINIC | Age: 55
End: 2025-02-20
Payer: COMMERCIAL

## 2025-02-20 NOTE — TELEPHONE ENCOUNTER
Mammogram results received and reviewed.  Overall, asymmetry of right breast appreciated with recommendation for further assessment with diagnostic mammogram and ultrasound.  Orders placed.  Will contact once results are received and reviewed

## 2025-02-24 ENCOUNTER — TELEPHONE (OUTPATIENT)
Dept: PRIMARY CARE | Facility: CLINIC | Age: 55
End: 2025-02-24
Payer: COMMERCIAL

## 2025-02-24 NOTE — TELEPHONE ENCOUNTER
PT. IS AWARE  IS OUT ON TODAY.    Manda had a Bl mammogram right diagnostic tomosynthesis and Bl US breast limited right both done on 02/19/25. She is calling for the results.    Manda's  # [phone]

## 2025-02-24 NOTE — TELEPHONE ENCOUNTER
As per the prior TE, imaging on 2/17/25 was reviewed to which additional diagnostic mammogram and US was recommended as ordered. Per patients chart, they give the impression that the imaging has not been completed yet. It might be a situation where we are just awaiting the imaging to be read by radiology but I would double check with the patient to confirm the nature in how these were done and potentionaly reach out radiology if there is an issue

## 2025-03-03 ENCOUNTER — HOSPITAL ENCOUNTER (OUTPATIENT)
Dept: RADIOLOGY | Facility: CLINIC | Age: 55
Discharge: HOME | End: 2025-03-03
Payer: COMMERCIAL

## 2025-03-03 DIAGNOSIS — R92.8 OTHER ABNORMAL AND INCONCLUSIVE FINDINGS ON DIAGNOSTIC IMAGING OF BREAST: ICD-10-CM

## 2025-03-03 PROCEDURE — 77061 BREAST TOMOSYNTHESIS UNI: CPT | Mod: RIGHT SIDE | Performed by: STUDENT IN AN ORGANIZED HEALTH CARE EDUCATION/TRAINING PROGRAM

## 2025-03-03 PROCEDURE — 77065 DX MAMMO INCL CAD UNI: CPT | Mod: RIGHT SIDE | Performed by: STUDENT IN AN ORGANIZED HEALTH CARE EDUCATION/TRAINING PROGRAM

## 2025-03-03 PROCEDURE — 77061 BREAST TOMOSYNTHESIS UNI: CPT | Mod: RT

## 2025-03-07 ENCOUNTER — APPOINTMENT (OUTPATIENT)
Dept: INTEGRATIVE MEDICINE | Facility: CLINIC | Age: 55
End: 2025-03-07
Payer: COMMERCIAL

## 2025-03-07 DIAGNOSIS — M54.2 CERVICALGIA: ICD-10-CM

## 2025-03-07 DIAGNOSIS — M99.02 SEGMENTAL AND SOMATIC DYSFUNCTION OF THORACIC REGION: ICD-10-CM

## 2025-03-07 DIAGNOSIS — M99.01 SEGMENTAL AND SOMATIC DYSFUNCTION OF CERVICAL REGION: ICD-10-CM

## 2025-03-07 DIAGNOSIS — M79.10 MYALGIA: ICD-10-CM

## 2025-03-07 DIAGNOSIS — M99.03 SEGMENTAL AND SOMATIC DYSFUNCTION OF LUMBAR REGION: Primary | ICD-10-CM

## 2025-03-07 DIAGNOSIS — M54.59 MECHANICAL LOW BACK PAIN: ICD-10-CM

## 2025-03-07 DIAGNOSIS — M99.05 SEGMENTAL AND SOMATIC DYSFUNCTION OF PELVIC REGION: ICD-10-CM

## 2025-03-07 PROCEDURE — 98941 CHIROPRACT MANJ 3-4 REGIONS: CPT | Performed by: CHIROPRACTOR

## 2025-03-07 PROCEDURE — 99203 OFFICE O/P NEW LOW 30 MIN: CPT | Performed by: CHIROPRACTOR

## 2025-03-07 ASSESSMENT — ENCOUNTER SYMPTOMS
HEADACHES: 1
NECK PAIN: 1
SHORTNESS OF BREATH: 0
BACK PAIN: 1
WEAKNESS: 0
NECK STIFFNESS: 1
TROUBLE SWALLOWING: 0
CHEST TIGHTNESS: 0
ARTHRALGIAS: 0
MYALGIAS: 1
JOINT SWELLING: 0
CHILLS: 0
FATIGUE: 0
DIFFICULTY URINATING: 0
FEVER: 0
FLANK PAIN: 0
LIGHT-HEADEDNESS: 0
NUMBNESS: 0
UNEXPECTED WEIGHT CHANGE: 0
VOMITING: 0
DIZZINESS: 0
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
## 2025-03-12 ENCOUNTER — ALLIED HEALTH (OUTPATIENT)
Dept: INTEGRATIVE MEDICINE | Facility: CLINIC | Age: 55
End: 2025-03-12
Payer: COMMERCIAL

## 2025-03-12 DIAGNOSIS — M99.05 SEGMENTAL AND SOMATIC DYSFUNCTION OF PELVIC REGION: ICD-10-CM

## 2025-03-12 DIAGNOSIS — M54.2 CERVICALGIA: ICD-10-CM

## 2025-03-12 DIAGNOSIS — M79.10 MYALGIA: ICD-10-CM

## 2025-03-12 DIAGNOSIS — M99.03 SEGMENTAL AND SOMATIC DYSFUNCTION OF LUMBAR REGION: Primary | ICD-10-CM

## 2025-03-12 DIAGNOSIS — M54.59 MECHANICAL LOW BACK PAIN: ICD-10-CM

## 2025-03-12 DIAGNOSIS — M99.02 SEGMENTAL AND SOMATIC DYSFUNCTION OF THORACIC REGION: ICD-10-CM

## 2025-03-12 DIAGNOSIS — M99.01 SEGMENTAL AND SOMATIC DYSFUNCTION OF CERVICAL REGION: ICD-10-CM

## 2025-03-12 PROCEDURE — 98941 CHIROPRACT MANJ 3-4 REGIONS: CPT | Performed by: CHIROPRACTOR
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
## 2025-03-26 ENCOUNTER — APPOINTMENT (OUTPATIENT)
Dept: PRIMARY CARE | Facility: CLINIC | Age: 55
End: 2025-03-26
Payer: COMMERCIAL

## 2025-03-26 ASSESSMENT — LIFESTYLE VARIABLES: HISTORY_OF_SMOKING: I HAVE NEVER SMOKED

## 2025-04-09 ENCOUNTER — APPOINTMENT (OUTPATIENT)
Dept: INTEGRATIVE MEDICINE | Facility: CLINIC | Age: 55
End: 2025-04-09
Payer: COMMERCIAL

## 2025-04-23 ENCOUNTER — APPOINTMENT (OUTPATIENT)
Dept: INTEGRATIVE MEDICINE | Facility: CLINIC | Age: 55
End: 2025-04-23
Payer: COMMERCIAL

## 2025-05-07 ENCOUNTER — APPOINTMENT (OUTPATIENT)
Dept: INTEGRATIVE MEDICINE | Facility: CLINIC | Age: 55
End: 2025-05-07
Payer: COMMERCIAL

## 2025-05-07 DIAGNOSIS — M99.05 SEGMENTAL AND SOMATIC DYSFUNCTION OF PELVIC REGION: ICD-10-CM

## 2025-05-07 DIAGNOSIS — M99.03 SEGMENTAL AND SOMATIC DYSFUNCTION OF LUMBAR REGION: Primary | ICD-10-CM

## 2025-05-07 DIAGNOSIS — M99.01 SEGMENTAL AND SOMATIC DYSFUNCTION OF CERVICAL REGION: ICD-10-CM

## 2025-05-07 DIAGNOSIS — M54.59 MECHANICAL LOW BACK PAIN: ICD-10-CM

## 2025-05-07 DIAGNOSIS — M99.02 SEGMENTAL AND SOMATIC DYSFUNCTION OF THORACIC REGION: ICD-10-CM

## 2025-05-07 DIAGNOSIS — M54.2 CERVICALGIA: ICD-10-CM

## 2025-05-07 DIAGNOSIS — M79.10 MYALGIA: ICD-10-CM

## 2025-05-07 PROCEDURE — 98941 CHIROPRACT MANJ 3-4 REGIONS: CPT | Performed by: CHIROPRACTOR
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
## 2025-06-25 ENCOUNTER — APPOINTMENT (OUTPATIENT)
Dept: INTEGRATIVE MEDICINE | Facility: CLINIC | Age: 55
End: 2025-06-25
Payer: COMMERCIAL

## 2025-07-09 ENCOUNTER — APPOINTMENT (OUTPATIENT)
Dept: INTEGRATIVE MEDICINE | Facility: CLINIC | Age: 55
End: 2025-07-09
Payer: COMMERCIAL

## 2025-08-06 ENCOUNTER — APPOINTMENT (OUTPATIENT)
Dept: INTEGRATIVE MEDICINE | Facility: CLINIC | Age: 55
End: 2025-08-06
Payer: COMMERCIAL

## 2025-08-27 ENCOUNTER — APPOINTMENT (OUTPATIENT)
Dept: INTEGRATIVE MEDICINE | Facility: CLINIC | Age: 55
End: 2025-08-27
Payer: COMMERCIAL